# Patient Record
Sex: MALE | Race: WHITE
[De-identification: names, ages, dates, MRNs, and addresses within clinical notes are randomized per-mention and may not be internally consistent; named-entity substitution may affect disease eponyms.]

---

## 2022-01-21 ENCOUNTER — HOSPITAL ENCOUNTER (EMERGENCY)
Dept: HOSPITAL 75 - ER FS | Age: 32
Discharge: HOME | End: 2022-01-21
Payer: COMMERCIAL

## 2022-01-21 VITALS — BODY MASS INDEX: 22.99 KG/M2 | WEIGHT: 149.91 LBS | HEIGHT: 67.72 IN

## 2022-01-21 VITALS — DIASTOLIC BLOOD PRESSURE: 90 MMHG | SYSTOLIC BLOOD PRESSURE: 159 MMHG

## 2022-01-21 DIAGNOSIS — E86.0: ICD-10-CM

## 2022-01-21 DIAGNOSIS — U07.1: Primary | ICD-10-CM

## 2022-01-21 LAB
ALBUMIN SERPL-MCNC: 4.8 GM/DL (ref 3.2–4.5)
ALP SERPL-CCNC: 53 U/L (ref 40–136)
ALT SERPL-CCNC: 29 U/L (ref 0–55)
BASOPHILS # BLD AUTO: 0 10^3/UL (ref 0–0.1)
BASOPHILS NFR BLD AUTO: 0 % (ref 0–10)
BILIRUB SERPL-MCNC: 1.6 MG/DL (ref 0.1–1)
BUN/CREAT SERPL: 16
CALCIUM SERPL-MCNC: 9.5 MG/DL (ref 8.5–10.1)
CHLORIDE SERPL-SCNC: 97 MMOL/L (ref 98–107)
CO2 SERPL-SCNC: 21 MMOL/L (ref 21–32)
CREAT SERPL-MCNC: 0.86 MG/DL (ref 0.6–1.3)
EOSINOPHIL # BLD AUTO: 0 10^3/UL (ref 0–0.3)
EOSINOPHIL NFR BLD AUTO: 0 % (ref 0–10)
GFR SERPLBLD BASED ON 1.73 SQ M-ARVRAT: 120 ML/MIN
GLUCOSE SERPL-MCNC: 174 MG/DL (ref 70–105)
HCT VFR BLD CALC: 43 % (ref 40–54)
HGB BLD-MCNC: 15.7 G/DL (ref 13.3–17.7)
LIPASE SERPL-CCNC: 14 U/L (ref 8–78)
LYMPHOCYTES # BLD AUTO: 1 X 10^3 (ref 1–4)
LYMPHOCYTES NFR BLD AUTO: 10 % (ref 12–44)
MANUAL DIFFERENTIAL PERFORMED BLD QL: NO
MCH RBC QN AUTO: 32 PG (ref 25–34)
MCHC RBC AUTO-ENTMCNC: 37 G/DL (ref 32–36)
MCV RBC AUTO: 86 FL (ref 80–99)
MONOCYTES # BLD AUTO: 0.8 X 10^3 (ref 0–1)
MONOCYTES NFR BLD AUTO: 9 % (ref 0–12)
NEUTROPHILS # BLD AUTO: 7.6 X 10^3 (ref 1.8–7.8)
NEUTROPHILS NFR BLD AUTO: 81 % (ref 42–75)
PLATELET # BLD: 247 10^3/UL (ref 130–400)
PMV BLD AUTO: 10.6 FL (ref 9–12.2)
POTASSIUM SERPL-SCNC: 3.3 MMOL/L (ref 3.6–5)
PROT SERPL-MCNC: 7.3 GM/DL (ref 6.4–8.2)
SODIUM SERPL-SCNC: 136 MMOL/L (ref 135–145)
WBC # BLD AUTO: 9.4 10^3/UL (ref 4.3–11)

## 2022-01-21 PROCEDURE — 87635 SARS-COV-2 COVID-19 AMP PRB: CPT

## 2022-01-21 PROCEDURE — 85025 COMPLETE CBC W/AUTO DIFF WBC: CPT

## 2022-01-21 PROCEDURE — 87804 INFLUENZA ASSAY W/OPTIC: CPT

## 2022-01-21 PROCEDURE — 83690 ASSAY OF LIPASE: CPT

## 2022-01-21 PROCEDURE — 36415 COLL VENOUS BLD VENIPUNCTURE: CPT

## 2022-01-21 PROCEDURE — 80053 COMPREHEN METABOLIC PANEL: CPT

## 2022-01-21 NOTE — ED GENERAL
General


Chief Complaint:  Abdominal/GI Problems


Stated Complaint:  VOMITTING;DIARRHEA


Nursing Triage Note:  


PT REPORTS HE STARTED HAVING DIARRHEA 36 HOURS AGO WITH CRAMPING AND NAUSEA.  


STARTED VOMITING LAST NIGHT. HE TOOK ZOFRAN AT 0100.


Source of Information:  Patient





History of Present Illness


Date Seen by Provider:  Jan 21, 2022


Time Seen by Provider:  07:04


Initial Comments


31-year-old male presenting with complaints of multiple episodes of diarrhea 

with abdominal cramping and nausea and vomiting in the last 36 hours.  He had 

tried some Zofran at home but felt it was not helping.  He was feeling dizzy and

lightheaded.  He states that the diarrhea has slowed down significantly because 

he was unable to keep anything down.  He denied any fever, chills, cough, nasal 

congestion, ill contacts.  He had eaten a lot of different types of fast food 

just prior to having his symptoms.  No other family members were having the same

sort of symptoms.  He has had some similar episodes of abdominal pain with 

nausea and vomiting in the past due to alcohol abuse but states that he has not 

had the diarrhea with it like at this time.  He has some worse pain in the left 

upper quadrant.  He states that his urine is greatly decreased and is a dark 

orange color.  He was concerned that there might be blood in his urine.  He also

thought that he saw some blood in his vomit initially. His pcp is Lindsay Doshi in [place]. He drove himself here to the ED.


Timing/Duration:  1-2 Days (about 36 hours)


Severity:  Severe


Modifying Factors:  worse with Eating


Associated Systoms:  No Chest Pain, No Cough, No Diaphoresis, No Fever/Chills, 

No Headaches, No Loss of Appetite; Malaise, Nausea/Vomiting; No Rash, No 

Seizure, No Shortness of Air, No Syncope, No Weakness





Allergies and Home Medications


Allergies


Coded Allergies:  


     No Known Drug Allergies (Unverified , 1/21/22)





Patient Home Medication List


Home Medication List Reviewed:  Yes


Dicyclomine HCl (Dicyclomine HCl) 10 Mg Capsule, 10 MG PO Q6H PRN for abdominal 

pain/cramping


   Prescribed by: TEE CRUZ on 1/21/22 0813


Ondansetron (Ondansetron Odt) 4 Mg Tab.rapdis, 4 MG PO Q6H PRN for 

NAUSEA/VOMITING


   Prescribed by: TEE CRUZ on 1/21/22 0827





Review of Systems


Review of Systems


Constitutional:  No chills; dizziness; No fever; malaise


EENTM:  no symptoms reported


Respiratory:  no symptoms reported


Cardiovascular:  no symptoms reported


Gastrointestinal:  see HPI


Genitourinary:  see HPI, decreased output


Musculoskeletal:  no symptoms reported


Skin:  No rash


Psychiatric/Neurological:  Anxiety


Hematologic/Lymphatic:  Denies Blood Clots





Past Medical-Social-Family Hx


Patient Social History


Tobacco Use?:  No


Use of E-Cig and/or Vaping dev:  No


Substance use?:  No


Alcohol Use?:  Yes


Alcohol type:  Hard Liquor


Alcohol Frequency:  Couple times a week





Past Medical History


Surgeries:  No


Respiratory:  No


Cardiac:  No


Neurological:  No


Genitourinary:  No


Gastrointestinal:  No


Musculoskeletal:  No


Endocrine:  No


HEENT:  No


Psychosocial:  No





Physical Exam


Vital Signs





Vital Signs - First Documented








 1/21/22





 07:03


 


Temp 36.5


 


Pulse 67


 


Resp 18


 


B/P (MAP) 159/90 (113)


 


Pulse Ox 99


 


O2 Delivery Room Air





Capillary Refill : Less Than 3 Seconds


Height, Weight, BMI


Height: '"


Weight: lbs. oz. kg; 22.00 BMI


Method:


General Appearance:  WD/WN, Anxious


HEENT:  PERRL/EOMI, Moist Mucous Membranes, Pharyngeal Erythema; No Tonsillar 

Exudate, No Tonsillar Enlargement


Neck:  Full Range of Motion, Normal Inspection, Non Tender, Supple


Respiratory:  Chest Non Tender, Lungs Clear, Normal Breath Sounds, No Accessory 

Muscle Use, No Respiratory Distress


Cardiovascular:  Regular Rate, Rhythm, Normal Peripheral Pulses


Gastrointestinal:  No Pulsatile Mass, Soft; No Distended, No Guarding, No 

Rebound; Tenderness (mild diffuse tenderness, worse in LUQ)


Back:  No CVA Tenderness, No Vertebral Tenderness


Extremity:  Normal Capillary Refill, Normal Inspection, Normal Range of Motion, 

No Pedal Edema


Neurologic/Psychiatric:  Alert, Oriented x3, Other (anxious)


Skin:  Normal Color, Warm/Dry





Progress/Results/Core Measures


Suspected Sepsis


SIRS


Temperature: 


Pulse: 67 


Respiratory Rate: 18


 


Laboratory Tests


1/21/22 07:15: White Blood Count 9.4


Blood Pressure 159 /90 


Mean: 113


 





Laboratory Tests


1/21/22 07:15: 


Creatinine 0.86, Platelet Count 247, Total Bilirubin 1.6H








Results/Orders


Lab Results





Laboratory Tests








Test


 1/21/22


07:15 1/21/22


07:35 Range/Units


 


 


White Blood Count


 9.4 


 


 4.3-11.0


10^3/uL


 


Red Blood Count


 4.96 


 


 4.30-5.52


10^6/uL


 


Hemoglobin 15.7   13.3-17.7  g/dL


 


Hematocrit 43   40-54  %


 


Mean Corpuscular Volume 86   80-99  fL


 


Mean Corpuscular Hemoglobin 32   25-34  pg


 


Mean Corpuscular Hemoglobin


Concent 37 H


 


 32-36  g/dL





 


Red Cell Distribution Width 12.4   10.0-14.5  %


 


Platelet Count


 247 


 


 130-400


10^3/uL


 


Mean Platelet Volume 10.6   9.0-12.2  fL


 


Immature Granulocyte % (Auto) 0    %


 


Neutrophils (%) (Auto) 81 H  42-75  %


 


Lymphocytes (%) (Auto) 10 L  12-44  %


 


Monocytes (%) (Auto) 9   0-12  %


 


Eosinophils (%) (Auto) 0   0-10  %


 


Basophils (%) (Auto) 0   0-10  %


 


Neutrophils # (Auto) 7.6   1.8-7.8  X 10^3


 


Lymphocytes # (Auto) 1.0   1.0-4.0  X 10^3


 


Monocytes # (Auto) 0.8   0.0-1.0  X 10^3


 


Eosinophils # (Auto)


 0.0 


 


 0.0-0.3


10^3/uL


 


Basophils # (Auto)


 0.0 


 


 0.0-0.1


10^3/uL


 


Immature Granulocyte # (Auto)


 0.0 


 


 0.0-0.1


10^3/uL


 


Sodium Level 136   135-145  MMOL/L


 


Potassium Level 3.3 L  3.6-5.0  MMOL/L


 


Chloride Level 97 L    MMOL/L


 


Carbon Dioxide Level 21   21-32  MMOL/L


 


Anion Gap 18 H  5-14  MMOL/L


 


Blood Urea Nitrogen 14   7-18  MG/DL


 


Creatinine


 0.86 


 


 0.60-1.30


MG/DL


 


Estimat Glomerular Filtration


Rate 120 


 


  





 


BUN/Creatinine Ratio 16    


 


Glucose Level 174 H    MG/DL


 


Calcium Level 9.5   8.5-10.1  MG/DL


 


Corrected Calcium    8.5-10.1  MG/DL


 


Total Bilirubin 1.6 H  0.1-1.0  MG/DL


 


Aspartate Amino Transf


(AST/SGOT) 29 


 


 5-34  U/L





 


Alanine Aminotransferase


(ALT/SGPT) 29 


 


 0-55  U/L





 


Alkaline Phosphatase 53     U/L


 


Total Protein 7.3   6.4-8.2  GM/DL


 


Albumin 4.8 H  3.2-4.5  GM/DL


 


Lipase 14   8-78  U/L


 


Influenza Type A Antigen  NEGATIVE  NEGATIVE  


 


Influenza Type B Antigen  NEGATIVE  NEGATIVE  








My Orders





Orders - TEE CRUZ MD


Comprehensive Metabolic Panel (1/21/22 07:23)


Lipase (1/21/22 07:23)


Ua Culture If Indicated (1/21/22 07:23)


Ed Iv/Invasive Line Start (1/21/22 07:23)


Cbc With Automated Diff (1/21/22 07:23)


Ns Iv 1000 Ml (Sodium Chloride 0.9%) (1/21/22 07:23)


Ondansetron Injection (Zofran Injectio (1/21/22 07:23)


Pantoprazole Injection (Protonix Injecti (1/21/22 07:23)


Ketorolac Injection (Toradol Injection) (1/21/22 07:23)


Dicyclomine Injection (Bentyl Injection) (1/21/22 07:23)


Coronavirus Sars-Cov-2 So 2019 (1/21/22 07:25)


Influenza A & B Antigens (1/21/22 07:25)


Ketorolac Injection (Toradol Injection) (1/21/22 07:35)


Ns Iv 1000 Ml (Sodium Chloride 0.9%) (1/21/22 08:23)





Vital Signs/I&O











 1/21/22 1/21/22





 07:03 09:01


 


Temp 36.5 36.5


 


Pulse 67 54


 


Resp 18 18


 


B/P (MAP) 159/90 (113) 159/90


 


Pulse Ox 99 99


 


O2 Delivery Room Air Room Air





Capillary Refill : Less Than 3 Seconds








Blood Pressure Mean:                    113








Progress Note #1:  


Progress Note


Check basic labs including lipase and urinalysis.  Give IV fluids for hydration,

Zofran for nausea, Protonix for gastritis and stomach irritation, Toradol for 

pain, Bentyl for abdominal pain and cramping.  Since COVID has been prominent in

the area we will also obtain a swab to check for COVID and influenza is is 

another possibility to trigger his symptoms.





Differential diagnosis includes food poisoning, alcohol gastritis, 

gastroenteritis, colitis, diverticulitis, COVID, influenza, anxiety, 

cholecystitis, appendicitis, pancreatitis


Progress Note #2:  


Progress Note


Labs appear stable without acute significant elevation of his white blood cell 

count.  He has mild elevation of the total bilirubin to 1.6.  His glucose was 

slightly elevated to 174.  His lipase was normal.  His BUN and creatinine were 

normal.  He reports that he was feeling a little bit better after treatment and 

fluids infused.  He has still not been able to urinate here. he has not tried po

fluids yet as he felt that his stomach was not ready. Will repeat 1 L NS IVF for

hydration. Advised pt that with his labs looking ok that I would continue IVF 

until he could urinate and he could take at least some ice chips or fluids by 

mouth without emesis.


Progress Note #3:  


Progress Note


Just after getting 2nd Liter of NS started pt was able to urinate a small amount

of dark orange colored urine. By the end of the infusion of the 2nd L of NS he 

was keeping down some po fluids and feeling better so will discharge to home on 

refill of Zofran and added Bentyl for cramping pain. Advised to take probiotic 

as well. Counseled to quarantine until results of Covid swab is known. Pt stated

he did not need a note for work.





Departure


Impression





   Primary Impression:  


   Nausea vomiting and diarrhea


   Additional Impressions:  


   Abdominal cramping


   Dehydration


   Person under investigation for COVID-19


Disposition:  01 HOME, SELF-CARE


Condition:  Improved





Departure-Patient Inst.


Decision time for Depature:  08:55


Referrals:  


Lindsay Doshi


Patient Instructions:  Dehydration, Adult ED, Diarrhea, Adult ED, Abdominal 

Pain, Adult ED, Nausea and Vomiting, Adult ED, COVID-19 Tests, COVID-19 ED





Add. Discharge Instructions:  


Stay well hydrated and drink plenty of fluids.





Follow a liquid diet for next 24-48 hours until your gut has had a chance to 

settle down and get back to more normal before you go back to eating more solid 

foods. Then advance slowly to bland and then regular foods.





Consider taking a Probiotic to help replace the good bacteria in your gut and 

recover from the Diarrhea. 





Until your have results from Covid test you should Quarantine and isolate in 

case your symptoms were caused by Covid. 


If you have a positive result you will get a call in 1-2 days when the result 

comes back.





If you have worsening symptoms instead of improving then return or seek medical 

care for further evaluation.





All discharge instructions reviewed with patient and/or family. Voiced 

understanding.


Scripts


Dicyclomine HCl (Dicyclomine HCl) 10 Mg Capsule


10 MG PO Q6H PRN for abdominal pain/cramping for 5 Days, #20 CAP 0 Refills


   Prov: TEE CRUZ MD         1/21/22 


Ondansetron (Ondansetron Odt) 4 Mg Tab.rapdis


4 MG PO Q6H PRN for NAUSEA/VOMITING for 5 Days, #20 TAB 0 Refills


   Prov: TEE CRUZ MD         1/21/22











TEE CRUZ MD               Jan 21, 2022 07:27

## 2022-11-05 ENCOUNTER — HOSPITAL ENCOUNTER (EMERGENCY)
Dept: HOSPITAL 75 - ER FS | Age: 32
Discharge: HOME | End: 2022-11-05
Payer: COMMERCIAL

## 2022-11-05 VITALS — DIASTOLIC BLOOD PRESSURE: 80 MMHG | SYSTOLIC BLOOD PRESSURE: 142 MMHG

## 2022-11-05 VITALS — HEIGHT: 67.99 IN | WEIGHT: 162.26 LBS | BODY MASS INDEX: 24.59 KG/M2

## 2022-11-05 DIAGNOSIS — R11.2: Primary | ICD-10-CM

## 2022-11-05 DIAGNOSIS — Z28.310: ICD-10-CM

## 2022-11-05 PROCEDURE — 99281 EMR DPT VST MAYX REQ PHY/QHP: CPT

## 2022-11-05 NOTE — ED GI
General


Chief Complaint:  Abdominal/GI Problems


Stated Complaint:  DIARRHEA,VOMITING,WEAKNESS


Nursing Triage Note:  


Patient reports feeling sick since 11/3/22.  Patient reports having nausea, 


vomiting and diarrhea.


Source of Information:  Patient


Exam Limitations:  No Limitations





History of Present Illness


Date Seen by Provider:  Nov 5, 2022


Time Seen by Provider:  06:00


Initial Comments


32-year-old male presents to the emergency department today for nausea, 

vomiting.  Symptoms started about 5 or 6 hours ago though he does admit that he 

felt nauseous yesterday, only started vomiting for the last 5 or 6 hours.  No 

sick contacts.  No fevers or chills.  He has diffuse abdominal cramping without 

any focal abdominal pain.  Emesis is nonbloody, nonbilious.





Allergies and Home Medications


Allergies


Coded Allergies:  


     No Known Drug Allergies (Unverified , 1/21/22)





Patient Home Medication List


Home Medication List Reviewed:  Yes


Dicyclomine HCl (Dicyclomine HCl) 10 Mg Capsule, 10 MG PO Q6H PRN for abdominal 

pain/cramping


   Prescribed by: TEE CRUZ on 1/21/22 0916


Ondansetron (Ondansetron Odt) 4 Mg Tab.rapdis, 4 MG PO Q6H PRN for 

NAUSEA/VOMITING


   Prescribed by: TEE JAMESRT on 1/21/22 0916





Review of Systems


Review of Systems


Constitutional:  no symptoms reported


EENTM:  No Symptoms Reported


Respiratory:  No Symptoms Reported


Cardiovascular:  No Symptoms Reported


Gastrointestinal:  Nausea, Vomiting


Genitourinary:  No Symptoms Reported


Musculoskeletal:  no symptoms reported


Skin:  no symptoms reported


Psychiatric/Neurological:  No Symptoms Reported


Endocrine:  No Symptoms Reported


Hematologic/Lymphatic:  No Symptoms Reported





Past Medical-Social-Family Hx


Patient Social History


Tobacco Use?:  No


Substance use?:  Yes


Substance type:  Marijuana


Alcohol Use?:  No


Pt feels they are or have been:  No





Past Medical History


Surgeries:  No


Respiratory:  No


Cardiac:  No


Neurological:  No


Genitourinary:  No


Gastrointestinal:  No


Musculoskeletal:  No


Endocrine:  No


HEENT:  No


Psychosocial:  No





Family Medical History


Reviewed Nursing Family Hx


No Pertinent Family Hx





Physical Exam


Vital Signs





Vital Signs - First Documented








 11/5/22





 05:52


 


Temp 36.4


 


Pulse 57


 


Resp 16


 


B/P (MAP) 142/80 (100)


 


Pulse Ox 99


 


O2 Delivery Room Air





Capillary Refill : Less Than 3 Seconds


Height/Weight/BMI


Height: '"


Weight: lbs. oz. kg; 24.00 BMI


Method:


General Appearance:  WD/WN, no apparent distress


HEENT:  normal ENT inspection, pharynx normal


Neck:  non-tender, supple, normal inspection


Respiratory:  chest non-tender, lungs clear, normal breath sounds, no 

respiratory distress, no accessory muscle use


Cardiovascular:  regular rate, rhythm, no edema, no gallop, no JVD, no murmur


Gastrointestinal:  non tender, soft, no organomegaly, no pulsatile mass, other 

(Hyperactive bowel sounds)


Extremities:  normal range of motion, non-tender, normal inspection, no pedal 

edema, no calf tenderness


Skin:  normal color, warm/dry


Lymphatic:  no adenopathy





Progress/Results/Core Measures


Results/Orders


My Orders





Orders - DEIDRE GONZALEZ DO


Ondansetron Injection (Zofran Injectio (11/5/22 06:15)


Normal Saline Bolus 1,000ml (11/5/22 06:15)





Vital Signs/I&O











 11/5/22





 05:52


 


Temp 36.4


 


Pulse 57


 


Resp 16


 


B/P (MAP) 142/80 (100)


 


Pulse Ox 99


 


O2 Delivery Room Air














Blood Pressure Mean:                    100











Departure


Impression





   Primary Impression:  


   Nausea and vomiting


   Qualified Codes:  R11.2 - Nausea with vomiting, unspecified


Disposition:  01 HOME, SELF-CARE


Condition:  Stable





Departure-Patient Inst.


Referrals:  


KANDY WEIR MD (PCP)


Primary Care Physician


Patient Instructions:  Nausea and Vomiting, Adult





Add. Discharge Instructions:  


Use Zofran as needed by dissolving under your tongue which should help with your

nausea.  Increase your fluids at home by taking small sips of fluids every 10 to

15 minutes.  It is okay if you do not feel like eating during your illness 

however you must continue to drink fluids.  Return to the emergency department 

for any severe concerns.  Follow-up with your primary doctor for any nonemergent

needs.





All discharge instructions reviewed with patient and/or family. Voiced 

understanding.


Scripts


Ondansetron (Ondansetron Odt) 8 Mg Tab.rapdis


8 MG SL Q4H PRN for NAUSEA/VOMITING for 3 Days, #18 TAB


   Prov: DEIDRE GONZALEZ DO         11/5/22











DEIDRE GONZALEZ DO             Nov 5, 2022 06:08